# Patient Record
Sex: MALE | URBAN - METROPOLITAN AREA
[De-identification: names, ages, dates, MRNs, and addresses within clinical notes are randomized per-mention and may not be internally consistent; named-entity substitution may affect disease eponyms.]

---

## 2018-12-17 ENCOUNTER — OFFICE VISIT (OUTPATIENT)
Dept: INTERNAL MEDICINE CLINIC | Age: 63
End: 2018-12-17

## 2018-12-17 VITALS
HEART RATE: 61 BPM | SYSTOLIC BLOOD PRESSURE: 139 MMHG | DIASTOLIC BLOOD PRESSURE: 91 MMHG | OXYGEN SATURATION: 95 % | BODY MASS INDEX: 30.59 KG/M2 | WEIGHT: 218.5 LBS | RESPIRATION RATE: 18 BRPM | TEMPERATURE: 97.8 F | HEIGHT: 71 IN

## 2018-12-17 DIAGNOSIS — E78.00 HYPERCHOLESTEROLEMIA: ICD-10-CM

## 2018-12-17 DIAGNOSIS — Z00.00 PREVENTATIVE HEALTH CARE: Primary | ICD-10-CM

## 2018-12-17 DIAGNOSIS — R05.9 COUGH: ICD-10-CM

## 2018-12-17 DIAGNOSIS — G47.33 OSA (OBSTRUCTIVE SLEEP APNEA): ICD-10-CM

## 2018-12-17 DIAGNOSIS — I10 ESSENTIAL HYPERTENSION: ICD-10-CM

## 2018-12-17 DIAGNOSIS — M25.511 ACUTE PAIN OF RIGHT SHOULDER: ICD-10-CM

## 2018-12-17 DIAGNOSIS — R79.9 ABNORMAL FINDING OF BLOOD CHEMISTRY: ICD-10-CM

## 2018-12-17 DIAGNOSIS — E78.5 DYSLIPIDEMIA: ICD-10-CM

## 2018-12-17 DIAGNOSIS — R35.1 NOCTURIA: ICD-10-CM

## 2018-12-17 DIAGNOSIS — F32.A DEPRESSION, UNSPECIFIED DEPRESSION TYPE: ICD-10-CM

## 2018-12-17 DIAGNOSIS — J44.9 COPD WITH ASTHMA (HCC): ICD-10-CM

## 2018-12-17 RX ORDER — NAPROXEN 500 MG/1
500 TABLET ORAL 2 TIMES DAILY WITH MEALS
Qty: 60 TAB | Refills: 3 | Status: SHIPPED | OUTPATIENT
Start: 2018-12-17 | End: 2018-12-17 | Stop reason: SDUPTHER

## 2018-12-17 RX ORDER — NAPROXEN 500 MG/1
TABLET ORAL
Qty: 180 TAB | Refills: 3 | Status: SHIPPED | OUTPATIENT
Start: 2018-12-17

## 2018-12-17 RX ORDER — PRAVASTATIN SODIUM 20 MG/1
20 TABLET ORAL
COMMUNITY
End: 2019-01-02 | Stop reason: SDUPTHER

## 2018-12-17 RX ORDER — FENOFIBRATE 160 MG/1
160 TABLET ORAL DAILY
COMMUNITY
End: 2019-01-02 | Stop reason: SDUPTHER

## 2018-12-17 RX ORDER — LOSARTAN POTASSIUM 50 MG/1
50 TABLET ORAL DAILY
Qty: 90 TAB | Refills: 11 | Status: SHIPPED | OUTPATIENT
Start: 2018-12-17 | End: 2019-01-02 | Stop reason: SDUPTHER

## 2018-12-17 NOTE — PROGRESS NOTES
SPORTS MEDICINE AND PRIMARY CARE  Ana Cisneros MD, [address] [address]  Phone:  [phone]  Fax: [phone]    Chief Complaint   Patient presents with    Establish Care       SUBJECTIVE:    Mele Calderon is a 66 y.o. male Patient comes in today and is seen as a new patient for evaluation and ongoing care. There is a history of depression, dyslipidemia, primary hypertension and is seen for preventive care visit. He does complain of a cough primarily at night, particularly when he lays down. He notes some wheezing also. He complains of right shoulder discomfort for the past month. There is no history of trauma. It is aggravated by change in position and he feels a lump on his shoulder, similar to the lump he has on his forehead. Patient is seen for evaluation. Current Outpatient Medications   Medication Sig Dispense Refill    fenofibrate (LOFIBRA) 160 mg tablet Take 160 mg by mouth daily.  pravastatin (PRAVACHOL) 20 mg tablet Take 20 mg by mouth nightly.  losartan (COZAAR) 50 mg tablet Take 1 Tab by mouth daily. 80 Tab 11     Past Medical History:   Diagnosis Date    COPD with asthma ([facility] [address] 12/17/2018    Cough 12/17/2018    Depression     Dyslipidemia     Hypercholesterolemia     Hypertension 2011    Preventative health care 12/17/2018    S/P colonoscopy 07/29/2015    [facility]     Past Surgical History:   Procedure Laterality Date    HX KNEE REPLACEMENT Bilateral      Not on File    REVIEW OF SYSTEMS:  General: negative for - chills or fever  ENT: negative for - headaches, nasal congestion or tinnitus  Respiratory: negative for - cough, hemoptysis, shortness of breath or wheezing  Cardiovascular : negative for - chest pain, edema, palpitations or shortness of breath  Gastrointestinal: negative for - abdominal pain, blood in stools, heartburn or nausea/vomiting  Genito-Urinary: no dysuria, trouble voiding, or hematuria  Musculoskeletal: negative for - gait disturbance, joint pain, joint stiffness or joint swelling  Neurological: no TIA or stroke symptoms  Hematologic: no bruises, no bleeding, no swollen glands  Integument: no lumps, mole changes, nail changes or rash  Endocrine:no malaise/lethargy or unexpected weight changes      Social History     Socioeconomic History    Marital status: UNKNOWN     Spouse name: Not on file    Number of children: Not on file    Years of education: Not on file    Highest education level: Not on file   Tobacco Use    Smoking status: Current Every Day Smoker    Smokeless tobacco: Never Used   Substance and Sexual Activity    Alcohol use: Yes     Comment: occasional    Drug use: Yes     Types: Marijuana    Sexual activity: Yes     Partners: Female     Birth control/protection: None     History reviewed. No pertinent family history. Habits:  Patient is down to a half pack of cigarettes a day. He has been smoking since the age of 14, which gives him a 48 pack year history. He has an occasional beer, maybe two to three times a week. Social History:  The patient is  and subsequently  since 2005. He has two sons, Darshan Michaud 28 and Sera Manning, who is 25. He completed his associate's degree. 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 Family History:  Father unknown. Mother 76 with seizure disorder, she is in a home. Patient was not raised by his mother, was raised by a family _____. OBJECTIVE:     Visit Vitals  BP (!) 139/91   Pulse 61   Temp 97.8 °F (36.6 °C) (Oral)   Resp 18   Ht 5' 11\" (1.803 m)   Wt 218 lb 8 oz (99.1 kg)   SpO2 95%   BMI 30.47 kg/m²     CONSTITUTIONAL: well , well nourished, appears age appropriate  EYES: perrla, eom intact  ENMT:moist mucous membranes, pharynx clear  NECK: supple.  Thyroid normal  RESPIRATORY: Chest: clear bilaterally  CARDIOVASCULAR: Heart: regular rate and rhythm  GASTROINTESTINAL: Abdomen: soft, bowel sounds active  HEMATOLOGIC: no pathological lymph nodes palpated  MUSCULOSKELETAL: Extremities: no edema, pulse 1+   INTEGUMENT: No unusual rashes or suspicious skin lesions noted. Nails appear normal.  NEUROLOGIC: non-focal exam   MENTAL STATUS: alert and oriented, appropriate affect     No results found for any previous visit. ASSESSMENT:   1. Preventative health care    2. Dyslipidemia    3. Essential hypertension    4. Hypercholesterolemia    5. Depression, unspecified depression type    6. Cough    7. COPD with asthma ([address]    8. Nocturia     9. Abnormal finding of blood chemistry       Patient has a lipoma in both his forehead and left shoulder. There is good range of motion of shoulder with some crepitation with range of motion. We suspect this is a bursitis and suggest he try Naprosyn for five days to see if that does not quiet it down and use a heating pad. Will take an xray to be sure there is no impingement syndrome. We will assess his cholesterol control with appropriate lab studies. He wonders about diabetes and we will confirm that with Hgb A1c. We will send him results in the mail. For the persistent cough will ask for a chest CT, although it may be related to Lisinopril, which he was taking previously, although he is not currently taking it on a regular basis. Will give him Losartan at 50 mg, replacing the Lisinopril. He is above his ideal body weight and we encourage physical activity 30 minutes five days a week and a heart healthy, weight reducing diet. We suggest he stop by the office in a month for a blood pressure check and thereafter he will see me every four to six months. We remind him he can walk in to the office at any time and see us if he is unable to get an appointment and he will be seen. We also remind him that the hospital we use is [facility]. I have discussed the diagnosis with the patient and the intended plan as seen in the  orders above.   The patient understands and agees with the plan. The patient has   received an after visit summary and questions were answered concerning  future plans  Patient labs and/or xrays were reviewed  Past records were reviewed. PLAN:  .  Orders Placed This Encounter    CT CHEST WO CONT    HEPATITIS C AB    URINALYSIS W/ RFLX MICROSCOPIC    CBC WITH AUTOMATED DIFF    METABOLIC PANEL, COMPREHENSIVE    LIPID PANEL    PROSTATE SPECIFIC AG    HEMOGLOBIN A1C WITH EAG    AMB POC EKG ROUTINE W/ 12 LEADS, INTER & REP    fenofibrate (LOFIBRA) 160 mg tablet    pravastatin (PRAVACHOL) 20 mg tablet    losartan (COZAAR) 50 mg tablet       Follow-up Disposition: Not on File    ATTENTION:   This medical record was transcribed using an electronic medical records system. Although proofread, it may and can contain electronic and spelling errors. Other human spelling and other errors may be present. Corrections may be executed at a later time. Please feel free to contact us for any clarifications as needed. Patient comes in as a new patient and is seen for evaluation and care. Reviewing the chart, there is a history of depression, dyslipidemia, primary hypertension and is seen for evaluation.

## 2018-12-17 NOTE — PROGRESS NOTES
1. Have you been to the ER, urgent care clinic since your last visit? Hospitalized since your last visit? No    2. Have you seen or consulted any other health care providers outside of the [address] [place] [address] since your last visit? Include any pap smears or colon screening.  No

## 2018-12-18 LAB
ALBUMIN SERPL-MCNC: 4.2 G/DL (ref 3.6–4.8)
ALBUMIN/GLOB SERPL: 2 {RATIO} (ref 1.2–2.2)
ALP SERPL-CCNC: 59 IU/L (ref 39–117)
ALT SERPL-CCNC: 26 IU/L (ref 0–44)
APPEARANCE UR: CLEAR
AST SERPL-CCNC: 20 IU/L (ref 0–40)
BASOPHILS # BLD AUTO: 0.1 X10E3/UL (ref 0–0.2)
BASOPHILS NFR BLD AUTO: 2 %
BILIRUB SERPL-MCNC: 0.3 MG/DL (ref 0–1.2)
BILIRUB UR QL STRIP: NEGATIVE
BUN SERPL-MCNC: 11 MG/DL (ref 8–27)
BUN/CREAT SERPL: 13 (ref 10–24)
CALCIUM SERPL-MCNC: 9.3 MG/DL (ref 8.6–10.2)
CHLORIDE SERPL-SCNC: 106 MMOL/L (ref 96–106)
CHOLEST SERPL-MCNC: 217 MG/DL (ref 100–199)
CO2 SERPL-SCNC: 22 MMOL/L (ref 20–29)
COLOR UR: YELLOW
CREAT SERPL-MCNC: 0.82 MG/DL (ref 0.76–1.27)
EOSINOPHIL # BLD AUTO: 0.3 X10E3/UL (ref 0–0.4)
EOSINOPHIL NFR BLD AUTO: 4 %
ERYTHROCYTE [DISTWIDTH] IN BLOOD BY AUTOMATED COUNT: 14.6 % (ref 12.3–15.4)
EST. AVERAGE GLUCOSE BLD GHB EST-MCNC: 114 MG/DL
GLOBULIN SER CALC-MCNC: 2.1 G/DL (ref 1.5–4.5)
GLUCOSE SERPL-MCNC: 81 MG/DL (ref 65–99)
GLUCOSE UR QL: NEGATIVE
HBA1C MFR BLD: 5.6 % (ref 4.8–5.6)
HCT VFR BLD AUTO: 44.5 % (ref 37.5–51)
HCV AB S/CO SERPL IA: <0.1 S/CO RATIO (ref 0–0.9)
HDLC SERPL-MCNC: 37 MG/DL
HGB BLD-MCNC: 14.8 G/DL (ref 13–17.7)
HGB UR QL STRIP: NEGATIVE
IMM GRANULOCYTES # BLD: 0 X10E3/UL (ref 0–0.1)
IMM GRANULOCYTES NFR BLD: 1 %
KETONES UR QL STRIP: NEGATIVE
LDLC SERPL CALC-MCNC: ABNORMAL MG/DL (ref 0–99)
LEUKOCYTE ESTERASE UR QL STRIP: NEGATIVE
LYMPHOCYTES # BLD AUTO: 2.4 X10E3/UL (ref 0.7–3.1)
LYMPHOCYTES NFR BLD AUTO: 33 %
MCH RBC QN AUTO: 30.3 PG (ref 26.6–33)
MCHC RBC AUTO-ENTMCNC: 33.3 G/DL (ref 31.5–35.7)
MCV RBC AUTO: 91 FL (ref 79–97)
MICRO URNS: NORMAL
MONOCYTES # BLD AUTO: 0.6 X10E3/UL (ref 0.1–0.9)
MONOCYTES NFR BLD AUTO: 8 %
NEUTROPHILS # BLD AUTO: 3.9 X10E3/UL (ref 1.4–7)
NEUTROPHILS NFR BLD AUTO: 52 %
NITRITE UR QL STRIP: NEGATIVE
PH UR STRIP: 5.5 [PH] (ref 5–7.5)
PLATELET # BLD AUTO: 230 X10E3/UL (ref 150–379)
POTASSIUM SERPL-SCNC: 4.3 MMOL/L (ref 3.5–5.2)
PROT SERPL-MCNC: 6.3 G/DL (ref 6–8.5)
PROT UR QL STRIP: NEGATIVE
PSA SERPL-MCNC: 1.1 NG/ML (ref 0–4)
RBC # BLD AUTO: 4.89 X10E6/UL (ref 4.14–5.8)
SODIUM SERPL-SCNC: 142 MMOL/L (ref 134–144)
SP GR UR: 1.01 (ref 1–1.03)
TRIGL SERPL-MCNC: 441 MG/DL (ref 0–149)
UROBILINOGEN UR STRIP-MCNC: 0.2 MG/DL (ref 0.2–1)
VLDLC SERPL CALC-MCNC: ABNORMAL MG/DL (ref 5–40)
WBC # BLD AUTO: 7.4 X10E3/UL (ref 3.4–10.8)

## 2019-01-02 RX ORDER — PRAVASTATIN SODIUM 20 MG/1
20 TABLET ORAL
Qty: 90 TAB | Refills: 3 | Status: SHIPPED | OUTPATIENT
Start: 2019-01-02 | End: 2019-11-18 | Stop reason: SDUPTHER

## 2019-01-02 RX ORDER — FENOFIBRATE 160 MG/1
160 TABLET ORAL DAILY
Qty: 90 TAB | Refills: 3 | Status: SHIPPED | OUTPATIENT
Start: 2019-01-02 | End: 2019-02-25 | Stop reason: SDUPTHER

## 2019-01-02 RX ORDER — LOSARTAN POTASSIUM 100 MG/1
100 TABLET ORAL DAILY
Qty: 90 TAB | Refills: 3 | Status: SHIPPED | OUTPATIENT
Start: 2019-01-02 | End: 2019-01-24

## 2019-01-11 ENCOUNTER — TELEPHONE (OUTPATIENT)
Dept: INTERNAL MEDICINE CLINIC | Age: 64
End: 2019-01-11

## 2019-01-11 NOTE — TELEPHONE ENCOUNTER
Patient called stating that his bp 164/111 with the losartan 100mg and wants to go back on lisinopril . New rx sent moncho for lisinopril 20/12.5mg 1 tab daily.

## 2019-01-24 ENCOUNTER — OFFICE VISIT (OUTPATIENT)
Dept: INTERNAL MEDICINE CLINIC | Age: 64
End: 2019-01-24

## 2019-01-24 VITALS
HEIGHT: 71 IN | BODY MASS INDEX: 30.28 KG/M2 | TEMPERATURE: 98.4 F | DIASTOLIC BLOOD PRESSURE: 90 MMHG | SYSTOLIC BLOOD PRESSURE: 134 MMHG | OXYGEN SATURATION: 95 % | RESPIRATION RATE: 18 BRPM | WEIGHT: 216.3 LBS | HEART RATE: 78 BPM

## 2019-01-24 DIAGNOSIS — J44.9 COPD WITH ASTHMA (HCC): ICD-10-CM

## 2019-01-24 DIAGNOSIS — Z13.39 SCREENING FOR ALCOHOLISM: ICD-10-CM

## 2019-01-24 DIAGNOSIS — Z13.31 SCREENING FOR DEPRESSION: ICD-10-CM

## 2019-01-24 DIAGNOSIS — E78.5 DYSLIPIDEMIA: ICD-10-CM

## 2019-01-24 DIAGNOSIS — I10 ESSENTIAL HYPERTENSION: ICD-10-CM

## 2019-01-24 DIAGNOSIS — E78.00 HYPERCHOLESTEROLEMIA: ICD-10-CM

## 2019-01-24 DIAGNOSIS — F32.A DEPRESSION, UNSPECIFIED DEPRESSION TYPE: ICD-10-CM

## 2019-01-24 DIAGNOSIS — Z00.00 MEDICARE ANNUAL WELLNESS VISIT, SUBSEQUENT: Primary | ICD-10-CM

## 2019-01-24 RX ORDER — IBUPROFEN 200 MG
1 TABLET ORAL EVERY 24 HOURS
Qty: 90 PATCH | Refills: 2 | Status: SHIPPED | OUTPATIENT
Start: 2019-01-24 | End: 2019-02-23

## 2019-01-24 RX ORDER — LISINOPRIL 20 MG/1
20 TABLET ORAL DAILY
Qty: 90 TAB | Refills: 3
Start: 2019-01-24 | End: 2019-11-18 | Stop reason: SDUPTHER

## 2019-01-24 NOTE — PROGRESS NOTES
1. Have you been to the ER, urgent care clinic since your last visit? Hospitalized since your last visit? No 
 
2. Have you seen or consulted any other health care providers outside of the [address] [place] [address] since your last visit? Include any pap smears or colon screening. No  
 
Wants to discuss BP and cramps

## 2019-01-24 NOTE — PATIENT INSTRUCTIONS
Medicare Wellness Visit, Male The best way to live healthy is to have a lifestyle where you eat a well-balanced diet, exercise regularly, limit alcohol use, and quit all forms of tobacco/nicotine, if applicable. Regular preventive services are another way to keep healthy. Preventive services (vaccines, screening tests, monitoring & exams) can help personalize your care plan, which helps you manage your own care. Screening tests can find health problems at the earliest stages, when they are easiest to treat. [facility] follows the current, evidence-based guidelines published by the [facility] ([facility]) when recommending preventive services for our patients. Because we follow these guidelines, sometimes recommendations change over time as research supports it. (For example, a prostate screening blood test is no longer routinely recommended for men with no symptoms.) Of course, you and your doctor may decide to screen more often for some diseases, based on your risk and co-morbidities (chronic disease you are already diagnosed with). Preventive services for you include: - Medicare offers their members a free annual wellness visit, which is time for you and your primary care provider to discuss and plan for your preventive service needs. Take advantage of this benefit every year! 
-All adults over age 72 should receive the recommended pneumonia vaccines. Current USPSTF guidelines recommend a series of two vaccines for the best pneumonia protection.  
-All adults should have a flu vaccine yearly and an ECG.  All adults age 61 and older should receive a shingles vaccine once in their lifetime.   
-All adults age 38-68 who are overweight should have a diabetes screening test once every three years.  
-Other screening tests & preventive services for persons with diabetes include: an eye exam to screen for diabetic retinopathy, a kidney function test, a foot exam, and stricter control over your cholesterol.  
-Cardiovascular screening for adults with routine risk involves an electrocardiogram (ECG) at intervals determined by the provider.  
-Colorectal cancer screening should be done for adults age 54-65 with no increased risk factors for colorectal cancer. There are a number of acceptable methods of screening for this type of cancer. Each test has its own benefits and drawbacks. Discuss with your provider what is most appropriate for you during your annual wellness visit. The different tests include: colonoscopy (considered the best screening method), a fecal occult blood test, a fecal DNA test, and sigmoidoscopy. 
-All adults born between [facility] should be screened once for Hepatitis C. 
-An Abdominal Aortic Aneurysm (AAA) Screening is recommended for men age 73-68 who has ever smoked in their lifetime. Here is a list of your current Health Maintenance items (your personalized list of preventive services) with a due date: 
Health Maintenance Due Topic Date Due  
 Annual Well Visit  12/05/2018 Body Mass Index: Care Instructions Your Care Instructions Body mass index (BMI) can help you see if your weight is raising your risk for health problems. It uses a formula to compare how much you weigh with how tall you are. · A BMI lower than 18.5 is considered underweight. · A BMI between 18.5 and 24.9 is considered healthy. · A BMI between 25 and 29.9 is considered overweight. A BMI of 30 or higher is considered obese. If your BMI is in the normal range, it means that you have a lower risk for weight-related health problems. If your BMI is in the overweight or obese range, you may be at increased risk for weight-related health problems, such as high blood pressure, heart disease, stroke, arthritis or joint pain, and diabetes.  If your BMI is in the underweight range, you may be at increased risk for health problems such as fatigue, lower protection (immunity) against illness, muscle loss, bone loss, hair loss, and hormone problems. BMI is just one measure of your risk for weight-related health problems. You may be at higher risk for health problems if you are not active, you eat an unhealthy diet, or you drink too much alcohol or use tobacco products. Follow-up care is a key part of your treatment and safety. Be sure to make and go to all appointments, and call your doctor if you are having problems. It's also a good idea to know your test results and keep a list of the medicines you take. How can you care for yourself at home? · Practice healthy eating habits. This includes eating plenty of fruits, vegetables, whole grains, lean protein, and low-fat dairy. · If your doctor recommends it, get more exercise. Walking is a good choice. Bit by bit, increase the amount you walk every day. Try for at least 30 minutes on most days of the week. · Do not smoke. Smoking can increase your risk for health problems. If you need help quitting, talk to your doctor about stop-smoking programs and medicines. These can increase your chances of quitting for good. · Limit alcohol to 2 drinks a day for men and 1 drink a day for women. Too much alcohol can cause health problems. If you have a BMI higher than 25 · Your doctor may do other tests to check your risk for weight-related health problems. This may include measuring the distance around your waist. A waist measurement of more than 40 inches in men or 35 inches in women can increase the risk of weight-related health problems. · Talk with your doctor about steps you can take to stay healthy or improve your health. You may need to make lifestyle changes to lose weight and stay healthy, such as changing your diet and getting regular exercise. If you have a BMI lower than 18.5 · Your doctor may do other tests to check your risk for health problems. · Talk with your doctor about steps you can take to stay healthy or improve your health. You may need to make lifestyle changes to gain or maintain weight and stay healthy, such as getting more healthy foods in your diet and doing exercises to build muscle. Where can you learn more? Go to http://dimitry-mt.info/. Enter S176 in the search box to learn more about \"Body Mass Index: Care Instructions. \" Current as of: October 13, 2016 Content Version: 11.4 © 0996-6174 [employer]. Care instructions adapted under license by [employer] (which disclaims liability or warranty for this information). If you have questions about a medical condition or this instruction, always ask your healthcare professional. [address] any warranty or liability for your use of this information.

## 2019-01-24 NOTE — PROGRESS NOTES
SPORTS MEDICINE AND PRIMARY CARE Radha Hsieh MD, [address] [address] Phone:  [phone]  Fax: [phone] Chief Complaint Patient presents with Johnson Annual Wellness Visit Liza Solis SUBJECTIVE: 
  Lenore Murillo is a 60 y.o. male Patient returns today with known history of dyslipidemia, depression, primary hypertension, COPD with asthma, cigarette abuse and is seen for evaluation. Current Outpatient Medications Medication Sig Dispense Refill  lisinopril (PRINIVIL, ZESTRIL) 20 mg tablet Take 1 Tab by mouth daily. 90 Tab 3  
 nicotine (NICODERM CQ) 21 mg/24 hr 1 Patch by TransDERmal route every twenty-four (24) hours for 30 days. 90 Patch 2  
 fenofibrate (LOFIBRA) 160 mg tablet Take 1 Tab by mouth daily. 90 Tab 3  
 naproxen (NAPROSYN) 500 mg tablet TAKE 1 TABLET BY MOUTH TWICE DAILY WITH MEALS 180 Tab 3  pravastatin (PRAVACHOL) 20 mg tablet Take 1 Tab by mouth nightly. 90 Tab 3 Past Medical History:  
Diagnosis Date  COPD with asthma ([address] 12/17/2018  Cough 12/17/2018  Depression  Dyslipidemia  Hypercholesterolemia  Hypertension 2011  Preventative health care 12/17/2018  S/P colonoscopy 07/29/2015  
 [facility] Past Surgical History:  
Procedure Laterality Date  HX KNEE REPLACEMENT Bilateral   
 
Not on File REVIEW OF SYSTEMS: 
General: negative for - chills or fever ENT: negative for - headaches, nasal congestion or tinnitus Respiratory: negative for - cough, hemoptysis, shortness of breath or wheezing Cardiovascular : negative for - chest pain, edema, palpitations or shortness of breath Gastrointestinal: negative for - abdominal pain, blood in stools, heartburn or nausea/vomiting Genito-Urinary: no dysuria, trouble voiding, or hematuria Musculoskeletal: negative for - gait disturbance, joint pain, joint stiffness or joint swelling Neurological: no TIA or stroke symptoms Hematologic: no bruises, no bleeding, no swollen glands Integument: no lumps, mole changes, nail changes or rash Endocrine: no malaise/lethargy or unexpected weight changes Social History Socioeconomic History  Marital status: UNKNOWN Spouse name: Not on file  Number of children: Not on file  Years of education: Not on file  Highest education level: Not on file Tobacco Use  Smoking status: Current Every Day Smoker  Smokeless tobacco: Never Used Substance and Sexual Activity  Alcohol use: Yes Comment: occasional  
 Drug use: No  
 Sexual activity: Yes  
  Partners: Female Birth control/protection: None Social History Narrative Habits:  Patient is down to a half pack of cigarettes a day. He has been smoking since the age of 14, which gives him a 48 pack year history. He has an occasional beer, maybe two to three times a week.  
    
 Social History:  The patient is  and subsequently  since 2005. He has two sons, Elyse Ibrahim and Mj Steiner, who is 25. 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  
    
 Family History:  Father unknown. Mother 76 with seizure disorder, she is in a home. Patient was not raised by his mother, was raised by a family History reviewed. No pertinent family history. OBJECTIVE: 
 
Visit Vitals /90 Pulse 78 Temp 98.4 °F (36.9 °C) (Oral) Resp 18 Ht 5' 11\" (1.803 m) Wt 216 lb 4.8 oz (98.1 kg) SpO2 95% BMI 30.17 kg/m² CONSTITUTIONAL: well , well nourished, appears age appropriate EYES: perrla, eom intact ENMT:moist mucous membranes, pharynx clear NECK: supple. Thyroid normal 
RESPIRATORY: Chest: clear bilaterally CARDIOVASCULAR: Heart: regular rate and rhythm GASTROINTESTINAL: Abdomen: soft, bowel sounds active HEMATOLOGIC: no pathological lymph nodes palpated MUSCULOSKELETAL: Extremities: no edema, pulse 1+ INTEGUMENT: No unusual rashes or suspicious skin lesions noted. Nails appear normal. 
NEUROLOGIC: non-focal exam  
MENTAL STATUS: alert and oriented, appropriate affect ASSESSMENT: 
1. Medicare annual wellness visit, subsequent 2. Screening for alcoholism 3. Screening for depression 4. Essential hypertension 5. Dyslipidemia 6. COPD with asthma ([facility] [address] 7. Hypercholesterolemia 8. Depression, unspecified depression type Patient continues to go AMA and smoke cigarettes. He claims to be cutting back. We hope this will be the year he stops completely. Currently he is having some stress related to his son, which we discuss. His blood pressure control is adequate, 134/90, I would like to see it at 130/80 or less. I suspect it drops down to that when he is relaxed and not stressing. Will ask him to come back in three to four months just so we can recheck his blood pressure. Lab studies are reviewed. We sent him a note in the mail about the results, but essentially his results were unremarkable. His cholesterol is pretty good. He will come by every three to four months for a blood pressure check. BMI reflects a 5 pound weight loss and we congratulate him. He plans to get his bike out and use that as his form of exercise. We will see him again in about a year but every three to four months for a blood pressure check. I have discussed the diagnosis with the patient and the intended plan as seen in the 
orders above. The patient understands and agees with the plan. The patient has  
received an after visit summary and questions were answered concerning 
future plans Patient labs and/or xrays were reviewed Past records were reviewed. PLAN: 
. Orders Placed This Encounter  Depression Screen Annual  
 lisinopril (PRINIVIL, ZESTRIL) 20 mg tablet  nicotine (NICODERM CQ) 21 mg/24 hr  
 
 
Follow-up Disposition: Return in about 4 months (around 5/24/2019) for bp check. ATTENTION:  
This medical record was transcribed using an electronic medical records system. Although proofread, it may and can contain electronic and spelling errors. Other human spelling and other errors may be present. Corrections may be executed at a later time. Please feel free to contact us for any clarifications as needed. Discussed the patient's BMI with him. The BMI follow up plan is as follows:  
 
dietary management education, guidance, and counseling 
encourage exercise 
monitor weight 
prescribed dietary intake An After Visit Summary was printed and given to the patient.

## 2019-02-25 RX ORDER — FENOFIBRATE 160 MG/1
160 TABLET ORAL DAILY
Qty: 90 TAB | Refills: 3 | Status: SHIPPED | OUTPATIENT
Start: 2019-02-25 | End: 2019-11-18 | Stop reason: SDUPTHER

## 2019-06-13 ENCOUNTER — OFFICE VISIT (OUTPATIENT)
Dept: BEHAVIORAL/MENTAL HEALTH CLINIC | Age: 64
End: 2019-06-13

## 2019-06-13 VITALS
SYSTOLIC BLOOD PRESSURE: 131 MMHG | WEIGHT: 212 LBS | HEART RATE: 66 BPM | DIASTOLIC BLOOD PRESSURE: 75 MMHG | HEIGHT: 71 IN | BODY MASS INDEX: 29.68 KG/M2

## 2019-06-13 DIAGNOSIS — F10.24 ALCOHOL DEPENDENCE WITH ALCOHOL-INDUCED MOOD DISORDER (HCC): Primary | ICD-10-CM

## 2019-06-13 DIAGNOSIS — F32.9 CURRENT EPISODE OF MAJOR DEPRESSIVE DISORDER WITHOUT PRIOR EPISODE, UNSPECIFIED DEPRESSION EPISODE SEVERITY: ICD-10-CM

## 2019-06-13 DIAGNOSIS — F10.980: ICD-10-CM

## 2019-06-13 DIAGNOSIS — Z86.59 HISTORY OF BIPOLAR DISORDER: ICD-10-CM

## 2019-06-13 RX ORDER — MIRTAZAPINE 15 MG/1
TABLET, FILM COATED ORAL
Qty: 90 TAB | Refills: 2 | Status: SHIPPED | OUTPATIENT
Start: 2019-06-13

## 2019-06-13 RX ORDER — NALTREXONE HYDROCHLORIDE 50 MG/1
50 TABLET, FILM COATED ORAL DAILY
Qty: 30 TAB | Refills: 2 | Status: SHIPPED | OUTPATIENT
Start: 2019-06-13

## 2019-06-13 RX ORDER — MIRTAZAPINE 15 MG/1
15 TABLET, FILM COATED ORAL
Qty: 30 TAB | Refills: 2 | Status: SHIPPED | OUTPATIENT
Start: 2019-06-13 | End: 2019-06-13 | Stop reason: SDUPTHER

## 2019-06-13 NOTE — PROGRESS NOTES
Yahir Canoreji  1955  60 y.o.  male  PATIENT REFUSED    Chief Complaint   Patient presents with    Depression     Geriatric depression scale score 12 indicating severe depression    Anxiety     Jim anxiety rating scale score 33 indicating severe anxiety    Bipolar     Mood disorder questionnaire positive with history of bipolar disorder but denies ever having any manic or hypomanic episode    Insomnia     Mostly middle to late    Addiction problem     Drink alcohol, smoke marijuana, smoke crack cocaine regularly       [place]:   This is a 60years old   male with reported psychiatric history of bipolar disorder who is referred by his insurance and was seen for the first time alone. He presented on time, was alert, awake, oriented x4. He was calm, cooperative, polite, and pleasant. He was able to provide pertinent history and discuss his treatment plan in detail. He is not taking any psychiatric medication at this time and in the past has only taking couple of medication for few days and was not able to tolerate. Patient was in his usual state of health until 2005 when he was seen by a psychiatrist at [facility] and was diagnosed with manic depression and was given some medication which made him confused and hallucinate and took an antidepressant which gave him diarrhea. He never went back and did not tried any other psychiatric medication or therapy and was recently had a significant episode of pancreatitis most probably due to his significant drinking and was asked by the insurance company to get help. Patient denied any episode of major depression and is a score on geriatric depression scale is 12 indicating severe depression. He continued to abuse alcohol, marijuana, and crack cocaine. He reports significant anxiety with Jim anxiety rating scale score 33 indicating severe anxiety. He presented symptoms of generalized anxiety.   His mood disorder questionnaire is positive and he report diagnosis of manic depression in past but denies ever having any manic or hypomanic episodes, denies ever having any hallucination, denies any symptoms suggestive of PTSD or OCD. Patient was provided with a lot of support and psychoeducation, after discussing risk and benefit and several treatment alternatives available he decided to try Remeron 7.5 mg to 15 mg at bedtime to help him with depression, anxiety, insomnia, and poor appetite with weight loss. He also agreed to get some help for his drinking and will take naltrexone 50 mg daily to quit drinking. He will be referred for psychotherapy once stabilized on medication. PAST: Patient denies any acute inpatient psychiatric hospitalization. He went to [facility] for 4 months about 7 years ago for detox and rehab and report 1 suicide attempt when he was 82 by overdose but did not require to be hospitalized. F/H: Patient never met his father and his mother left him when he was very young so he does not know much about his family history. LEGAL: DUI in 2004 and was arrested in 2011 with couple of nights in [facility]. SOCIAL: Patient was born in [place] and moved around with his mother and finally settling in [place] when he was 11years old. He moved to [place] about 20 years ago to be with his second wife who passed away in 2005 due to brain cancer. He was  first time and have 40-year-old son from first marriage and 59-year-old son from second marriage. He is in relationship for past 15 years and lives with his girlfriend. He is retired since 2011 and receive disability for his knee and diagnosis of bipolar disorder. He is [employer] and believe in God but does not go to [employer]. He has 1 year in college but denies ever getting any degree. SUBSTANCE USE HISTORY:   TOBACCO: Smoking since age 14 used to smoke half pack per day now decreased to 2-3 cigarettes daily.   ALCOHOL: First drink at age 19 currently drinking 6-8 beers 3 times a week for past 2 years. He used to drink a case of beer approximately every day for 4 years. CANNABIS: First time smoke at 19 last time was yesterday, currently smokes $5 worth which is 2 joints over 1 week. Used to smoke 20 joints per week for about 20-25 years. COCAINE: Started to smoke crack in his 25s and he smoked for about 10 years at that time last time was 3 days ago. He also sniff cocaine last time was 3 months ago. OPIOIDS: Denies heroin or any IV drug abuse. OTHERS: Abused S its black beauty is yellow jackets and many more about 30 years ago. MEDICAL HISTORY:    has a past medical history of COPD with asthma ([facility] [address] (12/17/2018), Cough (12/17/2018), Depression, Dyslipidemia, Hypercholesterolemia, Hypertension (2011), Preventative health care (12/17/2018), and S/P colonoscopy (07/29/2015). ALLERGIES:   Not on File    VITAL SIGNS:  /75   Pulse 66   Ht 5' 11\" (1.803 m)   Wt 96.2 kg (212 lb)   BMI 29.57 kg/m²     Current Outpatient Medications   Medication Sig Dispense Refill    naltrexone (DEPADE) 50 mg tablet Take 1 Tab by mouth daily. Indications: alcoholism 30 Tab 2    mirtazapine (REMERON) 15 mg tablet Take 1 Tab by mouth nightly. Indications: major depressive disorder, Posttraumatic Stress Syndrome 30 Tab 2    fenofibrate (LOFIBRA) 160 mg tablet Take 1 Tab by mouth daily. 90 Tab 3    lisinopril (PRINIVIL, ZESTRIL) 20 mg tablet Take 1 Tab by mouth daily. 90 Tab 3    pravastatin (PRAVACHOL) 20 mg tablet Take 1 Tab by mouth nightly.  90 Tab 3    naproxen (NAPROSYN) 500 mg tablet TAKE 1 TABLET BY MOUTH TWICE DAILY WITH MEALS 180 Tab 3       ROS:  Constitutional: Positive for weight loss  Eyes: Negative for contacts/glasses  ENT: Negative for hearing loss and tinnitus  Respiratory: Patient has history of asthma with COPD  Cardiovascular: Negative for chest pain, palpitations  Gastrointestinal: Negative for reflux symptoms and constipation  Genitourinary: Negative for frequency and urinary incontinence  Musculoskeletal: Negative for myalgias and arthralgias  Neurological: Positive for memory problems  Behavioral/psych: Positive for insomnia, anxiety, depression, negative for SI/HI  Endocrine: Negative for diabetic symptoms including polyuria and polydipsia    MENTAL STATUS EXAMINATION:   Patient is a 60 y.o. male PATIENT REFUSED who looks slightly older than his stated age. Patient appearance is appropriately dressed with good hygiene. Speech is regular rate and rhythm, fluent language, and thought process is linear, logical, and goal directed. Reported mood is depressed and anxious with mood congruent depressed and anxious affect. Patient denies any suicidal ideation, homicidal ideation, and auditory visual hallucination. Not observed to be delusional or paranoid. Insight, judgement, reliability and impulse control is limited to intact. His cognition is within normal limit and he is observed to be reliable. DIAGNOSIS:  Encounter Diagnoses   Name Primary?  Alcohol dependence with alcohol-induced mood disorder ([facility] [address] Yes    Current episode of major depressive disorder without prior episode, unspecified depression episode severity     Alcohol use, unspecified with [facility]     History of bipolar disorder        PLAN:  1. MEDICATION:   1. Depression, anxiety, insomnia, decreased appetite with weight loss: Mirtazapine 7.5 mg at bedtime for few days then 15 mg at bedtime. 2.  Alcohol dependence: Naltrexone 50 mg daily with understanding that he will not take any pain medication. Patient was provided with psycho education, discussed risk/benefits, and expectations from medication changes. Patient agrees with plan. 2. PSYCHOTHERAPY: Patient was provided with supportive therapy, strongly encourage to seek psychotherapy.     3. MEDICAL CARE: Patient was strongly encourage to take their medical medications and follow up with their PCP on regular basis. His weight today is 212 pounds, blood pressure is 131/75, and pulse is 66. He has hypertension, COPD with asthma, hypercholesterolemia, and history of both knee replacement. 4. SUBSTANCE ABUSE CARE: Patient smokes 2-3 cigarettes daily, drink 6-8 beers approximately 3 times a week. He smoke 2 joints over 1 week and smokes crack cocaine last was 3 days ago. 5. FOLLOW UP:   Follow-up and Dispositions    · Return in about 6 weeks (around 7/25/2019) for Medication management. Patient was seen face-to-face for 45 minutes with more than half time spent in supportive counseling.

## 2019-11-18 RX ORDER — FENOFIBRATE 160 MG/1
160 TABLET ORAL DAILY
Qty: 90 TAB | Refills: 3 | Status: SHIPPED | OUTPATIENT
Start: 2019-11-18

## 2019-11-18 RX ORDER — LISINOPRIL 20 MG/1
20 TABLET ORAL DAILY
Qty: 90 TAB | Refills: 3 | Status: SHIPPED | OUTPATIENT
Start: 2019-11-18 | End: 2020-02-10 | Stop reason: SDUPTHER

## 2019-11-18 RX ORDER — PRAVASTATIN SODIUM 20 MG/1
20 TABLET ORAL
Qty: 90 TAB | Refills: 3 | Status: SHIPPED | OUTPATIENT
Start: 2019-11-18

## 2020-02-10 RX ORDER — LISINOPRIL AND HYDROCHLOROTHIAZIDE 12.5; 2 MG/1; MG/1
TABLET ORAL
Qty: 90 TAB | Refills: 3 | Status: SHIPPED | OUTPATIENT
Start: 2020-02-10

## 2022-03-19 PROBLEM — R05.9 COUGH: Status: ACTIVE | Noted: 2018-12-17

## 2022-03-19 PROBLEM — J44.89 COPD WITH ASTHMA: Status: ACTIVE | Noted: 2018-12-17

## 2022-03-19 PROBLEM — J44.9 COPD WITH ASTHMA (HCC): Status: ACTIVE | Noted: 2018-12-17

## 2023-05-25 RX ORDER — NALTREXONE HYDROCHLORIDE 50 MG/1
50 TABLET, FILM COATED ORAL DAILY
COMMUNITY
Start: 2019-06-13

## 2023-05-25 RX ORDER — LISINOPRIL AND HYDROCHLOROTHIAZIDE 20; 12.5 MG/1; MG/1
1 TABLET ORAL DAILY
COMMUNITY
Start: 2020-02-10

## 2023-05-25 RX ORDER — PRAVASTATIN SODIUM 20 MG
20 TABLET ORAL
COMMUNITY
Start: 2019-11-18

## 2023-05-25 RX ORDER — MIRTAZAPINE 15 MG/1
TABLET, FILM COATED ORAL
COMMUNITY
Start: 2019-06-13

## 2023-05-25 RX ORDER — FENOFIBRATE 160 MG/1
160 TABLET ORAL DAILY
COMMUNITY
Start: 2019-11-18

## 2023-05-25 RX ORDER — NAPROXEN 500 MG/1
1 TABLET ORAL 2 TIMES DAILY WITH MEALS
COMMUNITY
Start: 2018-12-17